# Patient Record
Sex: MALE | Race: WHITE | Employment: FULL TIME | URBAN - METROPOLITAN AREA
[De-identification: names, ages, dates, MRNs, and addresses within clinical notes are randomized per-mention and may not be internally consistent; named-entity substitution may affect disease eponyms.]

---

## 2020-06-17 PROBLEM — I10 ESSENTIAL HYPERTENSION: Status: ACTIVE | Noted: 2020-06-17

## 2020-06-17 PROBLEM — Z87.2 HISTORY OF ROSACEA: Status: ACTIVE | Noted: 2020-06-17

## 2020-06-17 PROBLEM — Z87.2 HISTORY OF ECZEMA: Status: ACTIVE | Noted: 2020-06-17

## 2020-08-04 PROBLEM — L85.3 DRY SKIN: Status: ACTIVE | Noted: 2020-08-04

## 2021-11-15 ENCOUNTER — APPOINTMENT (OUTPATIENT)
Dept: GENERAL RADIOLOGY | Age: 56
End: 2021-11-15
Attending: EMERGENCY MEDICINE
Payer: COMMERCIAL

## 2021-11-15 ENCOUNTER — HOSPITAL ENCOUNTER (EMERGENCY)
Age: 56
Discharge: HOME | End: 2021-11-15
Attending: EMERGENCY MEDICINE
Payer: COMMERCIAL

## 2021-11-15 VITALS
RESPIRATION RATE: 17 BRPM | OXYGEN SATURATION: 96 % | HEART RATE: 72 BPM | BODY MASS INDEX: 28.88 KG/M2 | DIASTOLIC BLOOD PRESSURE: 73 MMHG | WEIGHT: 195 LBS | HEIGHT: 69 IN | SYSTOLIC BLOOD PRESSURE: 113 MMHG | TEMPERATURE: 98 F

## 2021-11-15 DIAGNOSIS — M25.532 LEFT WRIST PAIN: ICD-10-CM

## 2021-11-15 DIAGNOSIS — M25.561 ACUTE PAIN OF RIGHT KNEE: ICD-10-CM
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
## 2021-11-15 DIAGNOSIS — M79.671 RIGHT FOOT PAIN: Primary | ICD-10-CM

## 2021-11-15 PROCEDURE — 73630 X-RAY EXAM OF FOOT: CPT

## 2021-11-15 PROCEDURE — 73562 X-RAY EXAM OF KNEE 3: CPT

## 2021-11-15 PROCEDURE — 73110 X-RAY EXAM OF WRIST: CPT

## 2021-11-15 PROCEDURE — 99283 EMERGENCY DEPT VISIT LOW MDM: CPT

## 2021-11-16 NOTE — ED NOTES
I have reviewed discharge instructions with the patient. The patient verbalized understanding. Patient left ED via Private vehicle. Discharge Method: ambulatory to Home with wife. .    Opportunity for questions and clarification provided. Patient given 0 scripts. To continue your aftercare when you leave the hospital, you may receive an automated call from our care team to check in on how you are doing. This is a free service and part of our promise to provide the best care and service to meet your aftercare needs.  If you have questions, or wish to unsubscribe from this service please call [phone]. Thank you for Choosing our [facility] Emergency Department.
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
## 2021-11-23 PROBLEM — R53.83 FATIGUE: Status: ACTIVE | Noted: 2021-11-23

## 2021-11-23 PROBLEM — M15.9 GENERALIZED OSTEOARTHRITIS: Status: ACTIVE | Noted: 2021-11-23

## 2021-11-23 PROBLEM — J34.89 MAXILLARY SINUS MASS: Status: ACTIVE | Noted: 2019-04-30

## 2021-11-23 PROBLEM — K14.8 TONGUE LESION: Status: ACTIVE | Noted: 2019-07-24

## 2021-11-23 PROBLEM — D80.3 IGG DEFICIENCY (HCC): Status: ACTIVE | Noted: 2019-01-01

## 2021-11-23 PROBLEM — J32.0 CHRONIC MAXILLARY SINUSITIS: Status: ACTIVE | Noted: 2019-07-24

## 2021-11-23 PROBLEM — R76.8 ELEVATED ANTINUCLEAR ANTIBODY (ANA) LEVEL: Status: ACTIVE | Noted: 2021-11-23

## 2021-11-23 PROBLEM — R04.0 EPISTAXIS: Status: ACTIVE | Noted: 2019-06-25

## 2021-11-23 PROBLEM — E66.3 OVERWEIGHT WITH BODY MASS INDEX (BMI) 25.0-29.9: Status: ACTIVE | Noted: 2021-11-23

## 2021-11-23 PROBLEM — H02.889 MEIBOMIAN GLAND DISEASE: Status: ACTIVE | Noted: 2021-11-23

## 2022-03-18 PROBLEM — D80.3 IGG DEFICIENCY (HCC): Status: ACTIVE | Noted: 2019-01-01

## 2022-03-18 PROBLEM — E66.3 OVERWEIGHT WITH BODY MASS INDEX (BMI) 25.0-29.9: Status: ACTIVE | Noted: 2021-11-23

## 2022-03-18 PROBLEM — Z87.2 HISTORY OF ECZEMA: Status: ACTIVE | Noted: 2020-06-17

## 2022-03-19 PROBLEM — I10 ESSENTIAL HYPERTENSION: Status: ACTIVE | Noted: 2020-06-17

## 2022-03-19 PROBLEM — R53.83 FATIGUE: Status: ACTIVE | Noted: 2021-11-23

## 2022-03-19 PROBLEM — Z87.2 HISTORY OF ROSACEA: Status: ACTIVE | Noted: 2020-06-17

## 2022-03-19 PROBLEM — R04.0 EPISTAXIS: Status: ACTIVE | Noted: 2019-06-25

## 2022-03-19 PROBLEM — H02.889 MEIBOMIAN GLAND DISEASE: Status: ACTIVE | Noted: 2021-11-23

## 2022-03-19 PROBLEM — J32.0 CHRONIC MAXILLARY SINUSITIS: Status: ACTIVE | Noted: 2019-07-24

## 2022-03-19 PROBLEM — M15.9 GENERALIZED OSTEOARTHRITIS: Status: ACTIVE | Noted: 2021-11-23

## 2022-03-19 PROBLEM — L85.3 DRY SKIN: Status: ACTIVE | Noted: 2020-08-04

## 2022-03-19 PROBLEM — J34.89 MAXILLARY SINUS MASS: Status: ACTIVE | Noted: 2019-04-30

## 2022-03-20 PROBLEM — K14.8 TONGUE LESION: Status: ACTIVE | Noted: 2019-07-24

## 2022-03-20 PROBLEM — R76.8 ELEVATED ANTINUCLEAR ANTIBODY (ANA) LEVEL: Status: ACTIVE | Noted: 2021-11-23

## 2023-01-05 NOTE — TELEPHONE ENCOUNTER
Patient needs a refill of lisinopril to be sent to [facility] on [address] [place] [address] in [facility].  Patient has a physical scheduled for March 6th

## 2023-01-06 RX ORDER — LISINOPRIL 10 MG/1
10 TABLET ORAL DAILY
Qty: 90 TABLET | Refills: 3 | OUTPATIENT
Start: 2023-01-06

## 2023-01-06 NOTE — TELEPHONE ENCOUNTER
Appt. Made for 2/20/23 DISCUSSED RISK OF RETINAL TEAR or detachment, myopic degeneration, and glaucoma. Instructed to return immediately if new floaters, flashing lights, decreased central or peripheral vision, or pain.

## 2023-02-20 ENCOUNTER — OFFICE VISIT (OUTPATIENT)
Dept: INTERNAL MEDICINE CLINIC | Facility: CLINIC | Age: 58
End: 2023-02-20
Payer: COMMERCIAL

## 2023-02-20 ENCOUNTER — TELEPHONE (OUTPATIENT)
Dept: INTERNAL MEDICINE CLINIC | Facility: CLINIC | Age: 58
End: 2023-02-20

## 2023-02-20 VITALS
HEIGHT: 69 IN | OXYGEN SATURATION: 97 % | HEART RATE: 77 BPM | BODY MASS INDEX: 30.36 KG/M2 | DIASTOLIC BLOOD PRESSURE: 86 MMHG | WEIGHT: 205 LBS | SYSTOLIC BLOOD PRESSURE: 131 MMHG | TEMPERATURE: 98.2 F | RESPIRATION RATE: 16 BRPM

## 2023-02-20 DIAGNOSIS — Z12.11 ENCOUNTER FOR SCREENING COLONOSCOPY: ICD-10-CM

## 2023-02-20 DIAGNOSIS — I10 ESSENTIAL HYPERTENSION: ICD-10-CM

## 2023-02-20 DIAGNOSIS — Z53.20 HIV SCREENING DECLINED: ICD-10-CM

## 2023-02-20 DIAGNOSIS — Z12.5 SCREENING PSA (PROSTATE SPECIFIC ANTIGEN): ICD-10-CM

## 2023-02-20 DIAGNOSIS — R29.818 SUSPECTED SLEEP APNEA: ICD-10-CM

## 2023-02-20 DIAGNOSIS — Z23 NEED FOR SHINGLES VACCINE: Primary | ICD-10-CM

## 2023-02-20 PROCEDURE — 3079F DIAST BP 80-89 MM HG: CPT | Performed by: INTERNAL MEDICINE

## 2023-02-20 PROCEDURE — 99215 OFFICE O/P EST HI 40 MIN: CPT | Performed by: INTERNAL MEDICINE

## 2023-02-20 PROCEDURE — 3075F SYST BP GE 130 - 139MM HG: CPT | Performed by: INTERNAL MEDICINE

## 2023-02-20 RX ORDER — AMOXICILLIN 500 MG
CAPSULE ORAL
COMMUNITY

## 2023-02-20 RX ORDER — CYCLOSPORINE 0 G/ML
SOLUTION/ DROPS OPHTHALMIC; TOPICAL
COMMUNITY
Start: 2022-12-12

## 2023-02-20 RX ORDER — LISINOPRIL 10 MG/1
10 TABLET ORAL DAILY
Qty: 90 TABLET | Refills: 0 | Status: SHIPPED | OUTPATIENT
Start: 2023-02-20

## 2023-02-20 RX ORDER — ZOSTER VACCINE RECOMBINANT, ADJUVANTED 50 MCG/0.5
0.5 KIT INTRAMUSCULAR SEE ADMIN INSTRUCTIONS
Qty: 0.5 ML | Refills: 0 | Status: SHIPPED | OUTPATIENT
Start: 2023-02-20 | End: 2023-08-19

## 2023-02-20 SDOH — ECONOMIC STABILITY: HOUSING INSECURITY
IN THE LAST 12 MONTHS, WAS THERE A TIME WHEN YOU DID NOT HAVE A STEADY PLACE TO SLEEP OR SLEPT IN A SHELTER (INCLUDING NOW)?: PATIENT REFUSED

## 2023-02-20 SDOH — ECONOMIC STABILITY: FOOD INSECURITY: WITHIN THE PAST 12 MONTHS, YOU WORRIED THAT YOUR FOOD WOULD RUN OUT BEFORE YOU GOT MONEY TO BUY MORE.: PATIENT DECLINED

## 2023-02-20 SDOH — ECONOMIC STABILITY: FOOD INSECURITY: WITHIN THE PAST 12 MONTHS, THE FOOD YOU BOUGHT JUST DIDN'T LAST AND YOU DIDN'T HAVE MONEY TO GET MORE.: PATIENT DECLINED

## 2023-02-20 SDOH — ECONOMIC STABILITY: INCOME INSECURITY: HOW HARD IS IT FOR YOU TO PAY FOR THE VERY BASICS LIKE FOOD, HOUSING, MEDICAL CARE, AND HEATING?: PATIENT DECLINED

## 2023-02-20 ASSESSMENT — PATIENT HEALTH QUESTIONNAIRE - PHQ9
SUM OF ALL RESPONSES TO PHQ QUESTIONS 1-9: 0
1. LITTLE INTEREST OR PLEASURE IN DOING THINGS: 0
SUM OF ALL RESPONSES TO PHQ9 QUESTIONS 1 & 2: 0
2. FEELING DOWN, DEPRESSED OR HOPELESS: 0
SUM OF ALL RESPONSES TO PHQ QUESTIONS 1-9: 0

## 2023-02-20 ASSESSMENT — ENCOUNTER SYMPTOMS
ABDOMINAL PAIN: 0
COUGH: 0
SHORTNESS OF BREATH: 0

## 2023-02-20 NOTE — TELEPHONE ENCOUNTER
Kelvinus Alba was seen in office today and saw the HIV screening on his lab orders and would like to decline this test. Pt is requesting lab orders to be reordered without HIV screening.  Pt is having labs done at [facility].

## 2023-02-20 NOTE — PROGRESS NOTES
SUBJECTIVE:   Alyssia Lowe is a 56 y.o. male seen for a visit regarding   Chief Complaint   Patient presents with    Medication Refill        HPI  Dry eyes/Xerostomia, h/o ALIDA positive - saw Rheumatology; seeing [facility] eye [place], getting Light treatment for pain in his eye at [place] - dry eye specialist  HTN - no headache  Suspect sleep apnea - having pauses at night in breathing, snoring, has daytime tiredness  Elevated PSA - saw Urology  Did not do colonoscopy yet  Hyperlipidemia - on life style modification     Past Medical History, Past Surgical History, Family history, Social History, and Medications were all reviewed with the patient today and updated as necessary. Current Outpatient Medications   Medication Sig Dispense Refill    CEQUA 0.09 % SOLN INSTILL 1 DROP INTO BOTH EYES TWICE A DAY      Omega-3 Fatty Acids (FISH OIL) 1200 MG CAPS Take by mouth      lisinopril (PRINIVIL;ZESTRIL) 10 MG tablet Take 1 tablet by mouth daily 90 tablet 0    zoster recombinant adjuvanted vaccine (SHINGRIX) 50 MCG/0.5ML SUSR injection Inject 0.5 mLs into the muscle See Admin Instructions 1 dose now and repeat in 2-6 months 0.5 mL 0    ascorbic acid (VITAMIN C) 500 MG tablet Take by mouth      doxycycline hyclate (PERIOSTAT) 20 MG tablet Take 20 mg by mouth daily       No current facility-administered medications for this visit.      No Known Allergies  Patient Active Problem List   Diagnosis    IgG deficiency ([facility] [address]    History of eczema    Overweight with body mass index (BMI) 25.0-29.9    History of rosacea    Maxillary sinus mass    Fatigue    Epistaxis    Generalized osteoarthritis    Meibomian gland disease    Essential hypertension    Chronic maxillary sinusitis    Dry skin    Tongue lesion    Elevated antinuclear antibody (ALIDA) level    Kidney stone on left side     Past Medical History:   Diagnosis Date    Contact dermatitis and eczema due to cause     Hypertension     Kidney stones      Past Surgical History:   Procedure Laterality Date    HEENT      chronis sinus problems / no surgery    HEENT      vocal cord mass    ORTHOPEDIC SURGERY      hip replacement      Family History   Problem Relation Age of Onset    Cancer Mother      Social History     Tobacco Use    Smoking status: Never    Smokeless tobacco: Current   Substance Use Topics    Alcohol use: Not Currently         Review of Systems   Constitutional:  Negative for fever. Respiratory:  Negative for cough and shortness of breath. Cardiovascular:  Negative for chest pain and leg swelling. Gastrointestinal:  Negative for abdominal pain. Psychiatric/Behavioral:  Negative for behavioral problems and confusion. OBJECTIVE:  /86   Pulse 77   Temp 98.2 °F (36.8 °C) (Temporal)   Resp 16   Ht 5' 9\" (1.753 m)   Wt 205 lb (93 kg)   SpO2 97%   BMI 30.27 kg/m²      Physical Exam  Vitals and nursing note reviewed. Constitutional:       General: He is not in acute distress. Cardiovascular:      Rate and Rhythm: Normal rate and regular rhythm. Pulmonary:      Effort: Pulmonary effort is normal.      Breath sounds: No wheezing. Neurological:      General: No focal deficit present. Mental Status: He is oriented to person, place, and time. Psychiatric:         Mood and Affect: Mood normal.         Behavior: Behavior normal.       Medical problems and test results were reviewed with the patient today. No results found for this or any previous visit (from the past 672 hour(s)). ASSESSMENT and PLAN    Feng Waldron was seen today for medication refill. Diagnoses and all orders for this visit:    Need for shingles vaccine  -     zoster recombinant adjuvanted vaccine [facility]) 50 MCG/0.5ML SUSR injection; Inject 0.5 mLs into the muscle See Admin Instructions 1 dose now and repeat in 2-6 months    Screening PSA (prostate specific antigen)  -     Cancel: PSA Screening; Future  -     PSA Screening;  Future    Essential hypertension  - lisinopril (PRINIVIL;ZESTRIL) 10 MG tablet; Take 1 tablet by mouth daily  -     Cancel: CBC with Auto Differential; Future  -     Cancel: Comprehensive Metabolic Panel; Future  -     Cancel: Lipid Panel; Future  -     Cancel: TSH; Future  -     CBC with Auto Differential; Future  -     Comprehensive Metabolic Panel; Future  -     Lipid Panel; Future  -     TSH; Future    Encounter for screening colonoscopy  -     [facility]    Suspected sleep apnea  -     Raheem Roy MD, Sleep Medicine, [place]    HIV screening declined    Other orders  -     Cancel: HIV 1/2 Ag/Ab, 4TH Generation,W Rflx Confirm; Future        Return in about 6 weeks (around 4/3/2023) for Annual Preventative Visit (labs ~ 3 days before). It took more than 40 min to care for this pt today  Over 50% of today's office visit was spent in face to face time in counseling   (may include anyor all of the following: reviewing test results, prognosis, importance of compliance, education about disease process, benefits of medications, instructions for management of acute flare-ups, and follow up plans).

## 2023-04-04 LAB
ALBUMIN SERPL-MCNC: 4.7 G/DL (ref 3.8–4.9)
ALBUMIN/GLOB SERPL: 2.8 {RATIO} (ref 1.2–2.2)
ALP SERPL-CCNC: 85 IU/L (ref 44–121)
ALT SERPL-CCNC: 37 IU/L (ref 0–44)
AST SERPL-CCNC: 29 IU/L (ref 0–40)
BASOPHILS # BLD AUTO: 0.1 X10E3/UL (ref 0–0.2)
BASOPHILS NFR BLD AUTO: 1 %
BILIRUB SERPL-MCNC: 0.9 MG/DL (ref 0–1.2)
BUN SERPL-MCNC: 15 MG/DL (ref 6–24)
BUN/CREAT SERPL: 14 (ref 9–20)
CALCIUM SERPL-MCNC: 9.8 MG/DL (ref 8.7–10.2)
CHLORIDE SERPL-SCNC: 104 MMOL/L (ref 96–106)
CHOLEST SERPL-MCNC: 174 MG/DL (ref 100–199)
CO2 SERPL-SCNC: 24 MMOL/L (ref 20–29)
CREAT SERPL-MCNC: 1.05 MG/DL (ref 0.76–1.27)
EGFRCR SERPLBLD CKD-EPI 2021: 84 ML/MIN/1.73
EOSINOPHIL # BLD AUTO: 0.2 X10E3/UL (ref 0–0.4)
EOSINOPHIL NFR BLD AUTO: 4 %
ERYTHROCYTE [DISTWIDTH] IN BLOOD BY AUTOMATED COUNT: 13.2 % (ref 11.6–15.4)
GLOBULIN SER CALC-MCNC: 1.7 G/DL (ref 1.5–4.5)
GLUCOSE SERPL-MCNC: 101 MG/DL (ref 70–99)
HCT VFR BLD AUTO: 49 % (ref 37.5–51)
HDLC SERPL-MCNC: 43 MG/DL
HGB BLD-MCNC: 16.5 G/DL (ref 13–17.7)
IMM GRANULOCYTES # BLD AUTO: 0 X10E3/UL (ref 0–0.1)
IMM GRANULOCYTES NFR BLD AUTO: 0 %
IMP & REVIEW OF LAB RESULTS: NORMAL
LDLC SERPL CALC-MCNC: 113 MG/DL (ref 0–99)
LYMPHOCYTES # BLD AUTO: 1.6 X10E3/UL (ref 0.7–3.1)
LYMPHOCYTES NFR BLD AUTO: 27 %
MCH RBC QN AUTO: 31.1 PG (ref 26.6–33)
MCHC RBC AUTO-ENTMCNC: 33.7 G/DL (ref 31.5–35.7)
MCV RBC AUTO: 93 FL (ref 79–97)
MONOCYTES # BLD AUTO: 0.6 X10E3/UL (ref 0.1–0.9)
MONOCYTES NFR BLD AUTO: 10 %
NEUTROPHILS # BLD AUTO: 3.5 X10E3/UL (ref 1.4–7)
NEUTROPHILS NFR BLD AUTO: 58 %
PLATELET # BLD AUTO: 193 X10E3/UL (ref 150–450)
POTASSIUM SERPL-SCNC: 4.6 MMOL/L (ref 3.5–5.2)
PROT SERPL-MCNC: 6.4 G/DL (ref 6–8.5)
PSA SERPL-MCNC: 2 NG/ML (ref 0–4)
RBC # BLD AUTO: 5.3 X10E6/UL (ref 4.14–5.8)
SODIUM SERPL-SCNC: 141 MMOL/L (ref 134–144)
TRIGL SERPL-MCNC: 97 MG/DL (ref 0–149)
TSH SERPL DL<=0.005 MIU/L-ACNC: 0.54 UIU/ML (ref 0.45–4.5)
VLDLC SERPL CALC-MCNC: 18 MG/DL (ref 5–40)
WBC # BLD AUTO: 6.1 X10E3/UL (ref 3.4–10.8)

## 2023-04-17 ENCOUNTER — OFFICE VISIT (OUTPATIENT)
Dept: INTERNAL MEDICINE CLINIC | Facility: CLINIC | Age: 58
End: 2023-04-17
Payer: COMMERCIAL

## 2023-04-17 VITALS
RESPIRATION RATE: 16 BRPM | WEIGHT: 208 LBS | DIASTOLIC BLOOD PRESSURE: 85 MMHG | SYSTOLIC BLOOD PRESSURE: 128 MMHG | HEART RATE: 74 BPM | HEIGHT: 69 IN | OXYGEN SATURATION: 98 % | BODY MASS INDEX: 30.81 KG/M2

## 2023-04-17 DIAGNOSIS — Z00.00 ROUTINE GENERAL MEDICAL EXAMINATION AT A HEALTH CARE FACILITY: ICD-10-CM

## 2023-04-17 DIAGNOSIS — I10 ESSENTIAL HYPERTENSION: Primary | ICD-10-CM

## 2023-04-17 PROCEDURE — 3074F SYST BP LT 130 MM HG: CPT | Performed by: INTERNAL MEDICINE

## 2023-04-17 PROCEDURE — 3079F DIAST BP 80-89 MM HG: CPT | Performed by: INTERNAL MEDICINE

## 2023-04-17 PROCEDURE — 99386 PREV VISIT NEW AGE 40-64: CPT | Performed by: INTERNAL MEDICINE

## 2023-04-17 ASSESSMENT — ENCOUNTER SYMPTOMS
COUGH: 0
ABDOMINAL PAIN: 0
SHORTNESS OF BREATH: 0

## 2023-04-17 ASSESSMENT — PATIENT HEALTH QUESTIONNAIRE - PHQ9
SUM OF ALL RESPONSES TO PHQ9 QUESTIONS 1 & 2: 0
SUM OF ALL RESPONSES TO PHQ QUESTIONS 1-9: 0
SUM OF ALL RESPONSES TO PHQ QUESTIONS 1-9: 0
2. FEELING DOWN, DEPRESSED OR HOPELESS: 0
1. LITTLE INTEREST OR PLEASURE IN DOING THINGS: 0
SUM OF ALL RESPONSES TO PHQ QUESTIONS 1-9: 0
SUM OF ALL RESPONSES TO PHQ QUESTIONS 1-9: 0

## 2023-04-17 NOTE — PROGRESS NOTES
Annual Prevention visit    I have reviewed the patient's medical history in detail and updated the computerized patient record. History   Works /technician    Dry eyes/Xerostomia, h/o ALIDA positive - saw Rheumatology; seeing [facility] eye [place], getting Light treatment for pain in his eye at [place] - dry eye specialist  HTN - no headache, not taking lisinopril yesterday, advised home BP log  Suspect sleep apnea - having pauses at night in breathing, snoring, has daytime tiredness, has appt. with sleep medicine in May  H/o Elevated PSA - saw Urology  Hyperglycemia on labs  Had tdap in 2019 per patient  Patient plans to get Shingles vaccine in the future  Did not do colonoscopy yet, reminded again, patient will call them  Hyperlipidemia - on life style modification, will review next visit with BP    Past Medical History:   Diagnosis Date    Contact dermatitis and eczema due to cause     Hypertension     Kidney stones       Past Surgical History:   Procedure Laterality Date    HEENT      chronis sinus problems / no surgery    HEENT      vocal cord mass    ORTHOPEDIC SURGERY      hip replacement      Current Outpatient Medications   Medication Sig Dispense Refill    CEQUA 0.09 % SOLN INSTILL 1 DROP INTO BOTH EYES TWICE A DAY      Omega-3 Fatty Acids (FISH OIL) 1200 MG CAPS Take by mouth      lisinopril (PRINIVIL;ZESTRIL) 10 MG tablet Take 1 tablet by mouth daily 90 tablet 0    ascorbic acid (VITAMIN C) 500 MG tablet Take by mouth      doxycycline hyclate (PERIOSTAT) 20 MG tablet Take 1 tablet by mouth daily      zoster recombinant adjuvanted vaccine [facility]) 50 MCG/0.5ML SUSR injection Inject 0.5 mLs into the muscle See Admin Instructions 1 dose now and repeat in 2-6 months (Patient not taking: Reported on 4/17/2023) 0.5 mL 0     No current facility-administered medications for this visit.      No Known Allergies  Family History   Problem Relation Age of Onset    Cancer Mother      Social History

## 2023-05-02 ENCOUNTER — OFFICE VISIT (OUTPATIENT)
Dept: SLEEP MEDICINE | Age: 58
End: 2023-05-02
Payer: COMMERCIAL

## 2023-05-02 VITALS
RESPIRATION RATE: 16 BRPM | SYSTOLIC BLOOD PRESSURE: 120 MMHG | DIASTOLIC BLOOD PRESSURE: 90 MMHG | HEIGHT: 69 IN | WEIGHT: 209.2 LBS | OXYGEN SATURATION: 99 % | BODY MASS INDEX: 30.98 KG/M2 | HEART RATE: 79 BPM | TEMPERATURE: 97.2 F

## 2023-05-02 DIAGNOSIS — R06.83 SNORING: Primary | ICD-10-CM

## 2023-05-02 DIAGNOSIS — I10 ESSENTIAL HYPERTENSION: ICD-10-CM

## 2023-05-02 DIAGNOSIS — G25.81 RLS (RESTLESS LEGS SYNDROME): ICD-10-CM

## 2023-05-02 PROCEDURE — 3080F DIAST BP >= 90 MM HG: CPT | Performed by: INTERNAL MEDICINE

## 2023-05-02 PROCEDURE — 99204 OFFICE O/P NEW MOD 45 MIN: CPT | Performed by: INTERNAL MEDICINE

## 2023-05-02 PROCEDURE — 3074F SYST BP LT 130 MM HG: CPT | Performed by: INTERNAL MEDICINE

## 2023-05-02 RX ORDER — CLOBETASOL PROPIONATE 0.5 MG/G
CREAM TOPICAL 2 TIMES DAILY
COMMUNITY

## 2023-05-02 ASSESSMENT — SLEEP AND FATIGUE QUESTIONNAIRES
HOW LIKELY ARE YOU TO NOD OFF OR FALL ASLEEP WHILE SITTING QUIETLY AFTER LUNCH WITHOUT ALCOHOL: 1
HOW LIKELY ARE YOU TO NOD OFF OR FALL ASLEEP WHILE SITTING AND TALKING TO SOMEONE: 0
NECK CIRCUMFERENCE (INCHES): 15.5
HOW LIKELY ARE YOU TO NOD OFF OR FALL ASLEEP WHEN YOU ARE A PASSENGER IN A CAR FOR AN HOUR WITHOUT A BREAK: 2
HOW LIKELY ARE YOU TO NOD OFF OR FALL ASLEEP WHILE LYING DOWN TO REST IN THE AFTERNOON WHEN CIRCUMSTANCES PERMIT: 1
ESS TOTAL SCORE: 10
HOW LIKELY ARE YOU TO NOD OFF OR FALL ASLEEP IN A CAR, WHILE STOPPED FOR A FEW MINUTES IN TRAFFIC: 0
HOW LIKELY ARE YOU TO NOD OFF OR FALL ASLEEP WHILE WATCHING TV: 2
HOW LIKELY ARE YOU TO NOD OFF OR FALL ASLEEP WHILE SITTING AND READING: 2
HOW LIKELY ARE YOU TO NOD OFF OR FALL ASLEEP WHILE SITTING INACTIVE IN A PUBLIC PLACE: 2

## 2023-05-02 NOTE — PROGRESS NOTES
[address] [place] [address] Court, [address] [facility]  [phone]    PATIENT ID:  Mr. Annabelle Fajardo   1965, 56 y.o. male  His primary provider is Melvin Mckeon MD    CC: Mr. Kat Peralta comes in for evaluation of his  No chief complaint on file. HISTORY OF PRESENT ILLNESS  Mr. Kat Peralta is a 56 y.o.  male referred for snoring by Dr. Minda Barry. The patient has a medical history significant for hypertension, obesity with a BMI of 30, deviated septum with history of tonsillectomy but otherwise healthy. He is accompanied by his wife who provides collateral history. Patient is noted to snore and stop breathing in his sleep, this is worse on his back and he has done this for years, he does wake up with morning headache bilateral mild about 1-2 times a week usually resolved by breakfast, no nocturia. He has never had formal sleep testing. He does note sleepiness in meetings and occasionally with driving in the afternoon against the sun, he also gets forgetful at times. He falls asleep usually within 3 minutes and denies knowing anything going on when he is asleep, hard to wake up in the morning and his sleep is not restorative. He denies any insomnia or abnormal movements in sleep. He does occasionally have restless leg symptomatology about once a month and is not overly bothered by this. He has no other sleep concerns today and he is in fact here only because his wife made him come. ROS notable for:  No history of cardiopulmonary disease shortness of breath dyspnea chest pain, no history of syncope or neuromuscular weakness. He did have vocal cord lesion that was removed and found to be benign in the past.  He has had excessive weight gain.     Sleep Medicine 2023   Sitting and reading 2   Watching TV 2   Sitting, inactive in a public place (e.g. a theatre or a meeting) 2   As a passenger in a car for an hour without a break 2   Lying down to rest in the afternoon

## 2023-05-02 NOTE — ASSESSMENT & PLAN NOTE
Patient has clinical symptoms of restless leg syndrome, and this is not affecting his  ability to sleep. In fact it only occurs sporadically. Hernando Roa Restless leg syndrome symptomatology explained as well as educational handout given. Should this become bothersome in the future we can evaluate further at that time with blood work and treatment options.

## 2023-05-02 NOTE — PATIENT INSTRUCTIONS
It was a pleasure meeting you today. Here are some items that we discussed:    1. We will order a home sleep test and call you to discuss the results about 2 weeks after      Last Arriola MD  [phone]     As we discussed, I am recommending a portable home sleep study for you to further evaluate you for possible sleep apnea. The sleep lab will call you to make an appointment for you to  the monitor from our sleep lab. When you  your device, it will be fit to you by our sleep lab staff and you will put it on that night before going to bed. Please call us when you have your appointment so that we can schedule followup two weeks afterwards. Generally, we have you return the monitor to the lab the following day. The data is downloaded from the device, afterwhich one of our sleep specialist physicians will interpret the study and make recommendations for treatment based on the outcome of the study. If you have any questions please feel free to call our office. Apnea is defined as a pause in your breathing greater than 10 seconds. I will review the results of your sleep study at your next appointment. AHI, (apnea-hypopnea index) refers to the number of times you stop breathing per hour:  0-4.9  Is normal.  5-14.9 is mild sleep apnea. 15-29.9 is moderate sleep apnea. 30- and greater is severe sleep apnea    Last Arriola -[phone]    Sleep Apnea: Care Instructions  Overview     Sleep apnea means that you frequently stop breathing for 10 seconds or longer during sleep. It can be mild to severe, based on the number of times an hour that you stop breathing. Blocked or narrowed airways in your nose, mouth, or throat can cause sleep apnea. Your airway can become blocked when your throat muscles and tongue relax during sleep. You can help treat sleep apnea at home by making lifestyle changes.  You also can use a CPAP breathing machine that keeps tissues in the throat from blocking

## 2023-05-02 NOTE — ASSESSMENT & PLAN NOTE
Risk factors include snoring, witnessed apneas, unrefreshing sleep. His BMI is 30, with comorbidities of hypertension. I counseled him about obstructive sleep apnea including potential risks and complications of untreated disease with attention to neurocognitive effects as well as cardiovascular effects. Relationship to his symptoms was discussed. I recommended evaluation with complete test.  I explained testing procedure. I then reviewed treatment options. He is agreeable to evaluation and treatment. We will follow up 2 weeks after the study by phone to review the results. Educational handout given on KYLIE.

## 2023-05-04 ENCOUNTER — PATIENT MESSAGE (OUTPATIENT)
Dept: INTERNAL MEDICINE CLINIC | Facility: CLINIC | Age: 58
End: 2023-05-04

## 2023-05-04 DIAGNOSIS — I10 ESSENTIAL HYPERTENSION: ICD-10-CM

## 2023-05-11 RX ORDER — LISINOPRIL 10 MG/1
10 TABLET ORAL DAILY
Qty: 90 TABLET | Refills: 1 | Status: SHIPPED | OUTPATIENT
Start: 2023-05-11

## 2023-05-11 NOTE — TELEPHONE ENCOUNTER
From: Jessica Arzola  To: Dr. Severa Barker: 5/4/2023 1:14 PM EDT  Subject: Blood pressure readings    Doctor here is my blood pressure readings. 4-19-23 130/85  4-20-23 125/68 and 112/70  4-21-23 126/80  4-23-23 136/82  4-25-23 126/87  4-26-23 131/82  4-28-23 125/81  5-1-23 131/87  5-2-23 131/77  5-3-23 124/83  5-4-23 132/85  Please let me know if my medicine is going to change or stay the same.   Thanks,  Nan Chua

## 2023-05-17 PROBLEM — Z00.00 ROUTINE GENERAL MEDICAL EXAMINATION AT A HEALTH CARE FACILITY: Status: RESOLVED | Noted: 2023-04-17 | Resolved: 2023-05-17

## 2023-09-25 ENCOUNTER — HOSPITAL ENCOUNTER (OUTPATIENT)
Dept: SLEEP CENTER | Age: 58
Discharge: HOME | End: 2023-09-28

## 2023-10-12 NOTE — PROGRESS NOTES
[address] [place] [address]  [phone]    Patient Name:  Luly Ivan  YOB: 1965      Office Visit 10/13/2023    CHIEF COMPLAINT:    Chief Complaint   Patient presents with    Sleep Apnea    Sleep Study    Results         HISTORY OF PRESENT ILLNESS:  Patient is an 56 y.o. male seen today for follow up of RLS/KYLIE. Pt had a PSG/HST on 9/25/23 with an AHI of 7.4/hr with desaturations to 88%. Pt is accompanied by his wife. Pt reports that he continues to feel tired all of the time. He has very little energy. He just is not sleeping well. We discussed sleep apnea, risks of untreated sleep apnea, and then reviewed his sleep study. We then discussed his treatment options. Pt agreeable to start PAP therapy. Orders sent to [facility].              10/13/2023    11:00 AM 5/2/2023     3:38 PM   Sleep Medicine   Sitting and reading 3 2   Watching TV 2 2   Sitting, inactive in a public place (e.g. 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

## 2023-10-13 ENCOUNTER — OFFICE VISIT (OUTPATIENT)
Dept: SLEEP MEDICINE | Age: 58
End: 2023-10-13
Payer: COMMERCIAL

## 2023-10-13 VITALS
SYSTOLIC BLOOD PRESSURE: 120 MMHG | OXYGEN SATURATION: 98 % | BODY MASS INDEX: 30.3 KG/M2 | RESPIRATION RATE: 16 BRPM | TEMPERATURE: 97.2 F | WEIGHT: 204.6 LBS | HEART RATE: 71 BPM | DIASTOLIC BLOOD PRESSURE: 80 MMHG | HEIGHT: 69 IN

## 2023-10-13 DIAGNOSIS — G47.33 OSA (OBSTRUCTIVE SLEEP APNEA): Primary | ICD-10-CM

## 2023-10-13 DIAGNOSIS — G47.34 NOCTURNAL HYPOXEMIA: ICD-10-CM

## 2023-10-13 PROCEDURE — 3074F SYST BP LT 130 MM HG: CPT | Performed by: PHYSICIAN ASSISTANT

## 2023-10-13 PROCEDURE — 99214 OFFICE O/P EST MOD 30 MIN: CPT | Performed by: PHYSICIAN ASSISTANT

## 2023-10-13 PROCEDURE — 3079F DIAST BP 80-89 MM HG: CPT | Performed by: PHYSICIAN ASSISTANT

## 2023-10-13 ASSESSMENT — SLEEP AND FATIGUE QUESTIONNAIRES
HOW LIKELY ARE YOU TO NOD OFF OR FALL ASLEEP WHILE SITTING INACTIVE IN A PUBLIC PLACE: 1
ESS TOTAL SCORE: 14
HOW LIKELY ARE YOU TO NOD OFF OR FALL ASLEEP IN A CAR, WHILE STOPPED FOR A FEW MINUTES IN TRAFFIC: 0
HOW LIKELY ARE YOU TO NOD OFF OR FALL ASLEEP WHEN YOU ARE A PASSENGER IN A CAR FOR AN HOUR WITHOUT A BREAK: 2
HOW LIKELY ARE YOU TO NOD OFF OR FALL ASLEEP WHILE SITTING QUIETLY AFTER LUNCH WITHOUT ALCOHOL: 2
HOW LIKELY ARE YOU TO NOD OFF OR FALL ASLEEP WHILE WATCHING TV: 2
HOW LIKELY ARE YOU TO NOD OFF OR FALL ASLEEP WHILE SITTING AND TALKING TO SOMEONE: 1
HOW LIKELY ARE YOU TO NOD OFF OR FALL ASLEEP WHILE LYING DOWN TO REST IN THE AFTERNOON WHEN CIRCUMSTANCES PERMIT: 3
HOW LIKELY ARE YOU TO NOD OFF OR FALL ASLEEP WHILE SITTING AND READING: 3

## 2023-10-13 NOTE — PATIENT INSTRUCTIONS
The company who will be taking care of your CPAP supplies is:     Address: [address] [place] [address] [address]  Phone: [phone] Option #1            Fax: [phone]

## 2023-12-04 DIAGNOSIS — I10 ESSENTIAL HYPERTENSION: ICD-10-CM

## 2023-12-04 RX ORDER — LISINOPRIL 10 MG/1
10 TABLET ORAL DAILY
Qty: 30 TABLET | Refills: 0 | Status: SHIPPED | OUTPATIENT
Start: 2023-12-04

## 2023-12-04 NOTE — TELEPHONE ENCOUNTER
NEEDING:  - Lisinopril--10 mg/still has pills for ten more days/90 days supply[facility] [address], [place]/[phone]    He stated that he called the pharmacy last week, but he hasn't received any call from us nor from his pharmacy.

## 2024-01-17 DIAGNOSIS — I10 ESSENTIAL HYPERTENSION: ICD-10-CM

## 2024-01-17 NOTE — TELEPHONE ENCOUNTER
----- Message from Nga Rueda sent at 1/17/2024  3:54 PM EST -----  Subject: Refill Request    QUESTIONS  Name of Medication? lisinopril (PRINIVIL;ZESTRIL) 10 MG tablet  Patient-reported dosage and instructions? 10 MG 1 tablet daily  How many days do you have left? 10  [id number]  Pharmacy phone number (if available)? [phone]  Additional Information for Provider? 90 day supply  ---------------------------------------------------------------------------  --------------  CALL BACK INFO  What is the best way for the office to contact you? OK to leave message on   voicemail  Preferred Call Back Phone Number? [phone]  ---------------------------------------------------------------------------  --------------  SCRIPT ANSWERS  Relationship to Patient? Self

## 2024-01-18 RX ORDER — LISINOPRIL 10 MG/1
10 TABLET ORAL DAILY
Qty: 90 TABLET | Refills: 0 | Status: SHIPPED | OUTPATIENT
Start: 2024-01-18

## 2024-02-06 NOTE — PROGRESS NOTES
I encouraged him to continue utilizing his CPAP machine.     Elliot Rose MD  Sleep Medicine/Internal Medicine/Pediatrics

## 2024-02-07 ENCOUNTER — OFFICE VISIT (OUTPATIENT)
Dept: SLEEP MEDICINE | Age: 59
End: 2024-02-07
Payer: COMMERCIAL

## 2024-02-07 VITALS
OXYGEN SATURATION: 98 % | RESPIRATION RATE: 16 BRPM | DIASTOLIC BLOOD PRESSURE: 80 MMHG | BODY MASS INDEX: 31.67 KG/M2 | WEIGHT: 213.8 LBS | TEMPERATURE: 98.2 F | HEIGHT: 69 IN | SYSTOLIC BLOOD PRESSURE: 142 MMHG | HEART RATE: 82 BPM

## 2024-02-07 DIAGNOSIS — G25.81 RLS (RESTLESS LEGS SYNDROME): ICD-10-CM

## 2024-02-07 DIAGNOSIS — G47.33 OSA (OBSTRUCTIVE SLEEP APNEA): Primary | ICD-10-CM

## 2024-02-07 PROCEDURE — 3077F SYST BP >= 140 MM HG: CPT | Performed by: INTERNAL MEDICINE

## 2024-02-07 PROCEDURE — 3079F DIAST BP 80-89 MM HG: CPT | Performed by: INTERNAL MEDICINE

## 2024-02-07 PROCEDURE — 99214 OFFICE O/P EST MOD 30 MIN: CPT | Performed by: INTERNAL MEDICINE

## 2024-02-07 ASSESSMENT — SLEEP AND FATIGUE QUESTIONNAIRES
ESS TOTAL SCORE: 10
HOW LIKELY ARE YOU TO NOD OFF OR FALL ASLEEP WHILE SITTING QUIETLY AFTER LUNCH WITHOUT ALCOHOL: 1
HOW LIKELY ARE YOU TO NOD OFF OR FALL ASLEEP WHILE SITTING INACTIVE IN A PUBLIC PLACE: 1
HOW LIKELY ARE YOU TO NOD OFF OR FALL ASLEEP WHILE SITTING AND READING: 2
HOW LIKELY ARE YOU TO NOD OFF OR FALL ASLEEP WHEN YOU ARE A PASSENGER IN A CAR FOR AN HOUR WITHOUT A BREAK: 1
HOW LIKELY ARE YOU TO NOD OFF OR FALL ASLEEP WHILE WATCHING TV: 2
HOW LIKELY ARE YOU TO NOD OFF OR FALL ASLEEP IN A CAR, WHILE STOPPED FOR A FEW MINUTES IN TRAFFIC: 1
HOW LIKELY ARE YOU TO NOD OFF OR FALL ASLEEP WHILE SITTING AND TALKING TO SOMEONE: 0
HOW LIKELY ARE YOU TO NOD OFF OR FALL ASLEEP WHILE LYING DOWN TO REST IN THE AFTERNOON WHEN CIRCUMSTANCES PERMIT: 2

## 2024-02-07 NOTE — ASSESSMENT & PLAN NOTE
Patient has mild obstructive sleep apnea, is actually doing fairly well clinically on APAP 6-16 cm H2O.  He notes improved sleep quality and less daytime sleepiness.  His download does show persistent respiratory events close to 5 an hour which is not much better than his baseline of AHI of 7.  He is having 2-3 central events on average an hour which likely are transient treatment emergent centrals.  These tend to get better over time and we will monitor for another 6 months and should these persist we may consider a formal in lab titration study.  He is also awaiting septoplasty with [facility], would like to see him back about 2 months after but he is definitely wanting to continue with CPAP at this time as he does find it helpful.  Care of his machine and mask selection discussed educational handout given and sleep study reviewed briefly again and copy given to patient for his records.

## 2024-02-07 NOTE — PATIENT INSTRUCTIONS
equipment company.   If you are still having issues, please  use [employer] or call to contact our sleep clinic so that we can assist you.     If you have issues that are not about your CPAP (example: medication refills or side effects), please call the  at [phone].  Please work on maintaining a healthy weight.  A BMI <30 is considered a healthy weight. Current Body mass index is 31.57 kg/m².. If you are overweight, a loss of 2 pounds per month still comes out to about 25 pounds per year and could allow a reduction in CPAP pressure or even discontinuation of CPAP.

## 2024-04-18 ENCOUNTER — OFFICE VISIT (OUTPATIENT)
Dept: INTERNAL MEDICINE CLINIC | Facility: CLINIC | Age: 59
End: 2024-04-18

## 2024-04-18 VITALS
BODY MASS INDEX: 30.96 KG/M2 | OXYGEN SATURATION: 97 % | HEART RATE: 68 BPM | SYSTOLIC BLOOD PRESSURE: 120 MMHG | DIASTOLIC BLOOD PRESSURE: 86 MMHG | HEIGHT: 69 IN | WEIGHT: 209 LBS | RESPIRATION RATE: 16 BRPM

## 2024-04-18 DIAGNOSIS — I10 ESSENTIAL HYPERTENSION: ICD-10-CM

## 2024-04-18 DIAGNOSIS — E78.2 MIXED HYPERLIPIDEMIA: ICD-10-CM

## 2024-04-18 DIAGNOSIS — R73.9 HYPERGLYCEMIA: ICD-10-CM

## 2024-04-18 DIAGNOSIS — Z12.5 SCREENING PSA (PROSTATE SPECIFIC ANTIGEN): ICD-10-CM

## 2024-04-18 DIAGNOSIS — R73.9 HYPERGLYCEMIA: Primary | ICD-10-CM

## 2024-04-18 LAB
BASOPHILS # BLD AUTO: 0.1 X10E3/UL (ref 0–0.2)
BASOPHILS NFR BLD AUTO: 1 %
EOSINOPHIL # BLD AUTO: 0.1 X10E3/UL (ref 0–0.4)
EOSINOPHIL NFR BLD AUTO: 2 %
ERYTHROCYTE [DISTWIDTH] IN BLOOD BY AUTOMATED COUNT: 12.4 % (ref 11.6–15.4)
HBA1C MFR BLD: 5.7 % (ref 4.8–5.6)
HCT VFR BLD AUTO: 49 % (ref 37.5–51)
HGB BLD-MCNC: 16.5 G/DL (ref 13–17.7)
IMM GRANULOCYTES # BLD AUTO: 0 X10E3/UL (ref 0–0.1)
IMM GRANULOCYTES NFR BLD AUTO: 0 %
LYMPHOCYTES # BLD AUTO: 1.8 X10E3/UL (ref 0.7–3.1)
LYMPHOCYTES NFR BLD AUTO: 29 %
MCH RBC QN AUTO: 31.3 PG (ref 26.6–33)
MCHC RBC AUTO-ENTMCNC: 33.7 G/DL (ref 31.5–35.7)
MCV RBC AUTO: 93 FL (ref 79–97)
MONOCYTES # BLD AUTO: 0.6 X10E3/UL (ref 0.1–0.9)
MONOCYTES NFR BLD AUTO: 9 %
NEUTROPHILS # BLD AUTO: 3.6 X10E3/UL (ref 1.4–7)
NEUTROPHILS NFR BLD AUTO: 59 %
PLATELET # BLD AUTO: 205 X10E3/UL (ref 150–450)
RBC # BLD AUTO: 5.28 X10E6/UL (ref 4.14–5.8)
WBC # BLD AUTO: 6.3 X10E3/UL (ref 3.4–10.8)

## 2024-04-18 RX ORDER — LISINOPRIL 10 MG/1
10 TABLET ORAL DAILY
Qty: 90 TABLET | Refills: 0 | Status: CANCELLED | OUTPATIENT
Start: 2024-04-18

## 2024-04-18 SDOH — ECONOMIC STABILITY: FOOD INSECURITY: WITHIN THE PAST 12 MONTHS, YOU WORRIED THAT YOUR FOOD WOULD RUN OUT BEFORE YOU GOT MONEY TO BUY MORE.: PATIENT DECLINED

## 2024-04-18 SDOH — ECONOMIC STABILITY: HOUSING INSECURITY
IN THE LAST 12 MONTHS, WAS THERE A TIME WHEN YOU DID NOT HAVE A STEADY PLACE TO SLEEP OR SLEPT IN A SHELTER (INCLUDING NOW)?: PATIENT DECLINED

## 2024-04-18 SDOH — ECONOMIC STABILITY: INCOME INSECURITY: HOW HARD IS IT FOR YOU TO PAY FOR THE VERY BASICS LIKE FOOD, HOUSING, MEDICAL CARE, AND HEATING?: PATIENT DECLINED

## 2024-04-18 SDOH — ECONOMIC STABILITY: FOOD INSECURITY: WITHIN THE PAST 12 MONTHS, THE FOOD YOU BOUGHT JUST DIDN'T LAST AND YOU DIDN'T HAVE MONEY TO GET MORE.: PATIENT DECLINED

## 2024-04-18 ASSESSMENT — ENCOUNTER SYMPTOMS
ABDOMINAL PAIN: 0
SHORTNESS OF BREATH: 0
COUGH: 0

## 2024-04-18 ASSESSMENT — PATIENT HEALTH QUESTIONNAIRE - PHQ9
SUM OF ALL RESPONSES TO PHQ QUESTIONS 1-9: 0
2. FEELING DOWN, DEPRESSED OR HOPELESS: NOT AT ALL
SUM OF ALL RESPONSES TO PHQ9 QUESTIONS 1 & 2: 0
SUM OF ALL RESPONSES TO PHQ QUESTIONS 1-9: 0
SUM OF ALL RESPONSES TO PHQ QUESTIONS 1-9: 0
1. LITTLE INTEREST OR PLEASURE IN DOING THINGS: NOT AT ALL
SUM OF ALL RESPONSES TO PHQ QUESTIONS 1-9: 0

## 2024-04-18 NOTE — PROGRESS NOTES
Annual Prevention visit    I have reviewed the patient's medical history in detail and updated the computerized patient record.     History   Works /technician,      Dry eyes/Xerostomia, h/o ALIDA positive - saw Rheumatology; seeing [facility] locally and once or twice a year may get Light treatment for pain in his eye at [place] - dry eye specialist  HTN - no headache  KYLIE, RLS - seeing Sleep medicine - having pauses at night in breathing, snoring, has daytime tiredness, has appt. with sleep medicine in May  H/o Elevated PSA - saw Urology  Hyperglycemia on labs  Patient refused HIV test  Had tdap in 2019 per patient  Had Shingles vaccine  Colon polyps - next colonoscopy in 2026  Hyperlipidemia - on life style modification          Past Medical History:   Diagnosis Date    Contact dermatitis and eczema due to cause     Hypertension     Kidney stones       Past Surgical History:   Procedure Laterality Date    HEENT      chronis sinus problems / no surgery    HEENT      vocal cord mass    ORTHOPEDIC SURGERY      hip replacement      Current Outpatient Medications   Medication Sig Dispense Refill    lisinopril (PRINIVIL;ZESTRIL) 10 MG tablet Take 1 tablet by mouth daily 90 tablet 0    clobetasol (TEMOVATE) 0.05 % cream Apply topically 2 times daily Apply topically 2 times daily.      CEQUA 0.09 % SOLN INSTILL 1 DROP INTO BOTH EYES TWICE A DAY      Omega-3 Fatty Acids (FISH OIL) 1200 MG CAPS Take by mouth      doxycycline hyclate (PERIOSTAT) 20 MG tablet Take 1 tablet by mouth daily       No current facility-administered medications for this visit.     No Known Allergies  Family History   Problem Relation Age of Onset    Cancer Mother      Social History     Tobacco Use    Smoking status: Never    Smokeless tobacco: Never   Substance Use Topics    Alcohol use: Not Currently     Patient Active Problem List   Diagnosis    IgG deficiency (HCC)    History of eczema    Overweight with body mass index

## 2024-04-19 DIAGNOSIS — I10 ESSENTIAL HYPERTENSION: ICD-10-CM

## 2024-04-19 LAB
ALBUMIN SERPL-MCNC: 4.7 G/DL (ref 3.8–4.9)
ALBUMIN/GLOB SERPL: 2.2 {RATIO} (ref 1.2–2.2)
ALP SERPL-CCNC: 82 IU/L (ref 44–121)
ALT SERPL-CCNC: 43 IU/L (ref 0–44)
AST SERPL-CCNC: 28 IU/L (ref 0–40)
BILIRUB SERPL-MCNC: 0.8 MG/DL (ref 0–1.2)
BUN SERPL-MCNC: 13 MG/DL (ref 6–24)
BUN/CREAT SERPL: 12 (ref 9–20)
CALCIUM SERPL-MCNC: 10 MG/DL (ref 8.7–10.2)
CHLORIDE SERPL-SCNC: 101 MMOL/L (ref 96–106)
CHOLEST SERPL-MCNC: 206 MG/DL (ref 100–199)
CO2 SERPL-SCNC: 23 MMOL/L (ref 20–29)
CREAT SERPL-MCNC: 1.09 MG/DL (ref 0.76–1.27)
EGFRCR SERPLBLD CKD-EPI 2021: 80 ML/MIN/1.73
GLOBULIN SER CALC-MCNC: 2.1 G/DL (ref 1.5–4.5)
GLUCOSE SERPL-MCNC: 95 MG/DL (ref 70–99)
HDLC SERPL-MCNC: 46 MG/DL
IMP & REVIEW OF LAB RESULTS: NORMAL
LDLC SERPL CALC-MCNC: 142 MG/DL (ref 0–99)
POTASSIUM SERPL-SCNC: 4.8 MMOL/L (ref 3.5–5.2)
PROT SERPL-MCNC: 6.8 G/DL (ref 6–8.5)
PSA SERPL-MCNC: 1.8 NG/ML (ref 0–4)
SODIUM SERPL-SCNC: 140 MMOL/L (ref 134–144)
TRIGL SERPL-MCNC: 99 MG/DL (ref 0–149)
TSH SERPL DL<=0.005 MIU/L-ACNC: 0.54 UIU/ML (ref 0.45–4.5)
VLDLC SERPL CALC-MCNC: 18 MG/DL (ref 5–40)

## 2024-04-19 RX ORDER — LISINOPRIL 10 MG/1
10 TABLET ORAL DAILY
Qty: 90 TABLET | Refills: 1 | Status: SHIPPED | OUTPATIENT
Start: 2024-04-19

## 2024-08-19 NOTE — PROGRESS NOTES
notes he is not snoring when wearing his mask.  He has seen Dr. Wesly Patel at [facility] and they have agreed to undergo a septoplasty he is awaiting the date on this but hopes after this he may be able to tolerate just a nasal mask.  He has questions about his history of nasal fractures and could this have led to his sleep disordered breathing.  He has not had any other change in health or medications since last seen.  He did note he had upper respiratory tract infection for about 3 weeks and during that time he had difficulty tolerating CPAP but this has now resolved.    From 2023  The patient has a medical history significant for hypertension, obesity with a BMI of 30, deviated septum with history of tonsillectomy but otherwise healthy.  He is accompanied by his wife who provides collateral history.  Patient is noted to snore and stop breathing in his sleep, this is worse on his back and he has done this for years, he does wake up with morning headache bilateral mild about 1-2 times a week usually resolved by breakfast, no nocturia.  He has never had formal sleep testing.  He does note sleepiness in meetings and occasionally with driving in the afternoon against the sun, he also gets forgetful at times.  He falls asleep usually within 3 minutes and denies knowing anything going on when he is asleep, hard to wake up in the morning and his sleep is not restorative.  He denies any insomnia or abnormal movements in sleep.  He does occasionally have restless leg symptomatology about once a month and is not overly bothered by this.  He has no other sleep concerns today and he is in fact here only because his wife made him come.     ROS notable for:  No history of cardiopulmonary disease shortness of breath dyspnea chest pain, no history of syncope or neuromuscular weakness.  He did have vocal cord lesion that was removed and found to be benign in the past.  He has had excessive weight gain.      8/21/2024     4:27

## 2024-08-21 ENCOUNTER — OFFICE VISIT (OUTPATIENT)
Dept: SLEEP MEDICINE | Age: 59
End: 2024-08-21
Payer: COMMERCIAL

## 2024-08-21 VITALS
RESPIRATION RATE: 16 BRPM | TEMPERATURE: 97.2 F | SYSTOLIC BLOOD PRESSURE: 120 MMHG | HEIGHT: 69 IN | WEIGHT: 212.2 LBS | HEART RATE: 66 BPM | OXYGEN SATURATION: 97 % | BODY MASS INDEX: 31.43 KG/M2 | DIASTOLIC BLOOD PRESSURE: 79 MMHG

## 2024-08-21 DIAGNOSIS — G47.33 OSA (OBSTRUCTIVE SLEEP APNEA): Primary | ICD-10-CM

## 2024-08-21 DIAGNOSIS — G25.81 RLS (RESTLESS LEGS SYNDROME): ICD-10-CM

## 2024-08-21 DIAGNOSIS — G47.31 CSA (CENTRAL SLEEP APNEA): ICD-10-CM

## 2024-08-21 PROCEDURE — 3074F SYST BP LT 130 MM HG: CPT | Performed by: INTERNAL MEDICINE

## 2024-08-21 PROCEDURE — 99213 OFFICE O/P EST LOW 20 MIN: CPT | Performed by: INTERNAL MEDICINE

## 2024-08-21 PROCEDURE — 3078F DIAST BP <80 MM HG: CPT | Performed by: INTERNAL MEDICINE

## 2024-08-21 PROCEDURE — G2211 COMPLEX E/M VISIT ADD ON: HCPCS | Performed by: INTERNAL MEDICINE

## 2024-08-21 ASSESSMENT — SLEEP AND FATIGUE QUESTIONNAIRES
HOW LIKELY ARE YOU TO NOD OFF OR FALL ASLEEP WHILE SITTING INACTIVE IN A PUBLIC PLACE: WOULD NEVER DOZE
HOW LIKELY ARE YOU TO NOD OFF OR FALL ASLEEP IN A CAR, WHILE STOPPED FOR A FEW MINUTES IN TRAFFIC: WOULD NEVER DOZE
HOW LIKELY ARE YOU TO NOD OFF OR FALL ASLEEP WHILE SITTING AND READING: SLIGHT CHANCE OF DOZING
HOW LIKELY ARE YOU TO NOD OFF OR FALL ASLEEP WHILE SITTING AND TALKING TO SOMEONE: WOULD NEVER DOZE
ESS TOTAL SCORE: 7
HOW LIKELY ARE YOU TO NOD OFF OR FALL ASLEEP WHILE LYING DOWN TO REST IN THE AFTERNOON WHEN CIRCUMSTANCES PERMIT: MODERATE CHANCE OF DOZING
HOW LIKELY ARE YOU TO NOD OFF OR FALL ASLEEP WHEN YOU ARE A PASSENGER IN A CAR FOR AN HOUR WITHOUT A BREAK: HIGH CHANCE OF DOZING
HOW LIKELY ARE YOU TO NOD OFF OR FALL ASLEEP WHILE SITTING QUIETLY AFTER LUNCH WITHOUT ALCOHOL: SLIGHT CHANCE OF DOZING
HOW LIKELY ARE YOU TO NOD OFF OR FALL ASLEEP WHILE WATCHING TV: WOULD NEVER DOZE

## 2024-08-21 NOTE — ASSESSMENT & PLAN NOTE
Patient is having a few centrals on download that could be contributing, suspect these may be treatment emergent centrals due to PAP therapy.  If on titration study centrals are present I have asked them to convert to BiPAP ST.

## 2024-08-21 NOTE — ASSESSMENT & PLAN NOTE
Patient has mild obstructive sleep apnea, is actually doing fairly well clinically on APAP 6-16 cm H2O.  He does admit today that his sleep is not as restorative as he was hoping when he started feeling very groggy in the morning..  His download once again shows persistent respiratory events close to 5 an hour which is not much better than his baseline of AHI of 7.  He is having 2-3 central events on average an hour which likely are transient treatment emergent centrals.  As he is tracking his data avidly and also would like to get his sleep apnea under better control we agreed to do an in lab titration study.  Now that he has had his nasal surgery he is free to try a nasal mask as well if he would like an supply order sent to [facility].  We will call him after the study and adjust settings as indicated following up in roughly 6 months.

## 2024-08-21 NOTE — PATIENT INSTRUCTIONS
It was a pleasure seeing you today.  Here are some items that we discussed:    1.   WE will order an in lab sleep study to try to find better settings.  Bring your mask that you like.    2.   We will call you to adjust settings about 2-3 weeks after study.      Elliot Rose MD  [phone]       A SLEEP STUDY HAS BEEN ORDERED FOR YOU.     It can take up to 2 weeks for this to be scheduled. If after 2 weeks you have not heard from the Sleep Lab, please call [phone] or [phone] to schedule your test.     Thank you,     [facility]

## 2024-09-29 ENCOUNTER — HOSPITAL ENCOUNTER (OUTPATIENT)
Dept: SLEEP CENTER | Age: 59
Discharge: HOME | End: 2024-10-02

## 2024-10-18 DIAGNOSIS — I10 ESSENTIAL HYPERTENSION: ICD-10-CM

## 2024-10-18 RX ORDER — LISINOPRIL 10 MG/1
10 TABLET ORAL DAILY
Qty: 90 TABLET | Refills: 0 | Status: SHIPPED | OUTPATIENT
Start: 2024-10-18

## 2024-10-18 NOTE — TELEPHONE ENCOUNTER
Lisinopril 10 MG   [facility]/pharmacy #[id number] - [place], [place] - [address]    NTP scheduled on 12/26/24 with [facility].     Please advise.

## 2024-10-20 ENCOUNTER — HOSPITAL ENCOUNTER (OUTPATIENT)
Dept: SLEEP CENTER | Age: 59
Discharge: HOME | End: 2024-10-23
Payer: COMMERCIAL

## 2024-10-20 PROCEDURE — 95811 POLYSOM 6/>YRS CPAP 4/> PARM: CPT

## 2024-11-13 ENCOUNTER — TELEPHONE (OUTPATIENT)
Dept: SLEEP MEDICINE | Age: 59
End: 2024-11-13

## 2024-11-13 DIAGNOSIS — G47.33 OSA (OBSTRUCTIVE SLEEP APNEA): Primary | ICD-10-CM

## 2024-11-13 NOTE — TELEPHONE ENCOUNTER
Patient had recent sleep study showing mild sleep apnea. Cpap therapy is recommended per sleep interp.  Patient currently on cpap. Pressure will be changed to Cpap 10cm. Patient has agreed to pressure change.    Stacy Patel MA

## 2024-12-31 ENCOUNTER — OFFICE VISIT (OUTPATIENT)
Dept: INTERNAL MEDICINE CLINIC | Facility: CLINIC | Age: 59
End: 2024-12-31
Payer: COMMERCIAL

## 2024-12-31 VITALS
BODY MASS INDEX: 31.84 KG/M2 | TEMPERATURE: 98 F | HEIGHT: 69 IN | HEART RATE: 78 BPM | SYSTOLIC BLOOD PRESSURE: 120 MMHG | DIASTOLIC BLOOD PRESSURE: 86 MMHG | OXYGEN SATURATION: 98 % | WEIGHT: 215 LBS

## 2024-12-31 DIAGNOSIS — E78.2 MIXED HYPERLIPIDEMIA: Primary | ICD-10-CM

## 2024-12-31 DIAGNOSIS — G47.33 OSA (OBSTRUCTIVE SLEEP APNEA): ICD-10-CM

## 2024-12-31 DIAGNOSIS — I10 ESSENTIAL HYPERTENSION: ICD-10-CM

## 2024-12-31 PROCEDURE — 99214 OFFICE O/P EST MOD 30 MIN: CPT | Performed by: INTERNAL MEDICINE

## 2024-12-31 PROCEDURE — 3074F SYST BP LT 130 MM HG: CPT | Performed by: INTERNAL MEDICINE

## 2024-12-31 PROCEDURE — 3079F DIAST BP 80-89 MM HG: CPT | Performed by: INTERNAL MEDICINE

## 2024-12-31 RX ORDER — DOXYCYCLINE HYCLATE 100 MG
100 TABLET ORAL DAILY
COMMUNITY
Start: 2024-12-13

## 2024-12-31 RX ORDER — LISINOPRIL 10 MG/1
10 TABLET ORAL DAILY
Qty: 90 TABLET | Refills: 3 | Status: SHIPPED | OUTPATIENT
Start: 2024-12-31

## 2024-12-31 NOTE — PROGRESS NOTES
has a history of eczema, which has recently flared up. He applies some cream for his skin rashes. He has a history of osteoarthritis, which necessitated a hip replacement. He has a history of multiple polyps, with the most recent colonoscopy performed on 08/28/2024. He is due for another colonoscopy in 2026. He has a history of tonsillectomy. He has a history of skin tag removal and sees a dermatologist every 6 months. He has no history of cancer. His last PSA test was conducted on 04/24/2024, with a result of 3.3. He is requesting a refill of his lisinopril prescription and wishes to schedule blood work and a wellness check in 04/2025.    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    MEDICATIONS  Lisinopril    Social History     Tobacco Use    Smoking status: Never    Smokeless tobacco: Never   Vaping Use    Vaping status: Never Used   Substance Use Topics    Alcohol use: Not Currently    Drug use: Never     Vitals:    12/31/24 0916   BP: 120/86   Site: Left Upper Arm   Position: Sitting   Cuff Size: Large Adult   Pulse: 78   Temp: 98 °F (36.7 °C)   TempSrc: Temporal   SpO2: 98%   Weight: 97.5 kg (215 lb)   Height: 1.753 m (5' 9\")     Body mass index is 31.75 kg/m².  Physical Exam  Vitals and nursing note reviewed.   Constitutional:       General: He is not in acute distress.     Appearance: Normal appearance. He is well-groomed. He is not ill-appearing or toxic-appearing.   HENT:      Head: Normocephalic and atraumatic.      Nose: Nose normal. No congestion.   Eyes:      Extraocular Movements: Extraocular movements intact.      Conjunctiva/sclera: Conjunctivae normal.   Cardiovascular:      Rate and Rhythm: Normal rate and regular rhythm.      Pulses: Normal pulses.      Heart sounds: Normal heart sounds. No murmur heard.  Pulmonary:      Effort: Pulmonary effort is normal. No respiratory distress.      Breath sounds: Normal breath sounds. No wheezing.   Abdominal:      General: Abdomen is flat. Bowel sounds

## 2025-02-27 ENCOUNTER — OFFICE VISIT (OUTPATIENT)
Dept: INTERNAL MEDICINE CLINIC | Facility: CLINIC | Age: 60
End: 2025-02-27
Payer: COMMERCIAL

## 2025-02-27 VITALS
HEART RATE: 82 BPM | DIASTOLIC BLOOD PRESSURE: 60 MMHG | HEIGHT: 69 IN | TEMPERATURE: 98.1 F | OXYGEN SATURATION: 99 % | BODY MASS INDEX: 30.51 KG/M2 | SYSTOLIC BLOOD PRESSURE: 100 MMHG | WEIGHT: 206 LBS

## 2025-02-27 DIAGNOSIS — R52 GENERALIZED BODY ACHES: ICD-10-CM

## 2025-02-27 DIAGNOSIS — R50.9 FEVER, UNSPECIFIED FEVER CAUSE: Primary | ICD-10-CM

## 2025-02-27 DIAGNOSIS — J10.1 INFLUENZA A: ICD-10-CM

## 2025-02-27 LAB
EXP DATE SOLUTION: NORMAL
EXP DATE SWAB: NORMAL
EXPIRATION DATE: NORMAL
INFLUENZA A ANTIGEN, POC: POSITIVE
INFLUENZA B ANTIGEN, POC: NEGATIVE
LOT NUMBER POC: NORMAL
LOT NUMBER SOLUTION: NORMAL
LOT NUMBER SWAB: NORMAL
SARS-COV-2 RNA, POC: NEGATIVE
VALID INTERNAL CONTROL, POC: YES

## 2025-02-27 PROCEDURE — 3078F DIAST BP <80 MM HG: CPT | Performed by: INTERNAL MEDICINE

## 2025-02-27 PROCEDURE — 3074F SYST BP LT 130 MM HG: CPT | Performed by: INTERNAL MEDICINE

## 2025-02-27 PROCEDURE — 99213 OFFICE O/P EST LOW 20 MIN: CPT | Performed by: INTERNAL MEDICINE

## 2025-02-27 PROCEDURE — 87502 INFLUENZA DNA AMP PROBE: CPT | Performed by: INTERNAL MEDICINE

## 2025-02-27 PROCEDURE — 87635 SARS-COV-2 COVID-19 AMP PRB: CPT | Performed by: INTERNAL MEDICINE

## 2025-02-27 RX ORDER — OSELTAMIVIR PHOSPHATE 75 MG/1
75 CAPSULE ORAL 2 TIMES DAILY
Qty: 10 CAPSULE | Refills: 0 | Status: SHIPPED | OUTPATIENT
Start: 2025-02-27 | End: 2025-03-04

## 2025-02-27 SDOH — ECONOMIC STABILITY: FOOD INSECURITY: WITHIN THE PAST 12 MONTHS, YOU WORRIED THAT YOUR FOOD WOULD RUN OUT BEFORE YOU GOT MONEY TO BUY MORE.: NEVER TRUE

## 2025-02-27 SDOH — ECONOMIC STABILITY: FOOD INSECURITY: WITHIN THE PAST 12 MONTHS, THE FOOD YOU BOUGHT JUST DIDN'T LAST AND YOU DIDN'T HAVE MONEY TO GET MORE.: NEVER TRUE

## 2025-02-27 ASSESSMENT — PATIENT HEALTH QUESTIONNAIRE - PHQ9
SUM OF ALL RESPONSES TO PHQ QUESTIONS 1-9: 0
1. LITTLE INTEREST OR PLEASURE IN DOING THINGS: NOT AT ALL
SUM OF ALL RESPONSES TO PHQ QUESTIONS 1-9: 0
2. FEELING DOWN, DEPRESSED OR HOPELESS: NOT AT ALL

## 2025-02-27 NOTE — PROGRESS NOTES
patient (or guardian, if applicable) and other individuals in attendance with the patient were advised that Artificial Intelligence will be utilized during this visit to record, process the conversation to generate a clinical note, and support improvement of the AI technology. The patient (or guardian, if applicable) and other individuals in attendance at the appointment consented to the use of AI, including the recording.        An electronic signature was used to authenticate this note.  Haresh Joshi DO

## 2025-02-28 ENCOUNTER — TELEPHONE (OUTPATIENT)
Dept: INTERNAL MEDICINE CLINIC | Facility: CLINIC | Age: 60
End: 2025-02-28

## 2025-02-28 NOTE — TELEPHONE ENCOUNTER
Had appointment yesterday for FLU  States he has some other symptoms  Coughing up yellow stuff today can something be called in for that    Today and would like to talk with the nurse     Please call patient back and advise what he needs to do

## 2025-04-29 ENCOUNTER — LAB (OUTPATIENT)
Dept: INTERNAL MEDICINE CLINIC | Facility: CLINIC | Age: 60
End: 2025-04-29

## 2025-04-29 DIAGNOSIS — I10 ESSENTIAL HYPERTENSION: ICD-10-CM

## 2025-04-29 DIAGNOSIS — E78.2 MIXED HYPERLIPIDEMIA: ICD-10-CM

## 2025-04-29 DIAGNOSIS — G47.33 OSA (OBSTRUCTIVE SLEEP APNEA): ICD-10-CM

## 2025-04-29 LAB
ALBUMIN SERPL-MCNC: 4 G/DL (ref 3.5–5)
ALBUMIN/GLOB SERPL: 1.5 (ref 1–1.9)
ALP SERPL-CCNC: 75 U/L (ref 40–129)
ALT SERPL-CCNC: 42 U/L (ref 8–55)
ANION GAP SERPL CALC-SCNC: 11 MMOL/L (ref 7–16)
AST SERPL-CCNC: 34 U/L (ref 15–37)
BILIRUB SERPL-MCNC: 0.7 MG/DL (ref 0–1.2)
BUN SERPL-MCNC: 15 MG/DL (ref 6–23)
CALCIUM SERPL-MCNC: 9.5 MG/DL (ref 8.8–10.2)
CHLORIDE SERPL-SCNC: 105 MMOL/L (ref 98–107)
CHOLEST SERPL-MCNC: 183 MG/DL (ref 0–200)
CO2 SERPL-SCNC: 25 MMOL/L (ref 20–29)
CREAT SERPL-MCNC: 1.07 MG/DL (ref 0.8–1.3)
ERYTHROCYTE [DISTWIDTH] IN BLOOD BY AUTOMATED COUNT: 13.1 % (ref 11.9–14.6)
EST. AVERAGE GLUCOSE BLD GHB EST-MCNC: 118 MG/DL
GLOBULIN SER CALC-MCNC: 2.6 G/DL (ref 2.3–3.5)
GLUCOSE SERPL-MCNC: 106 MG/DL (ref 70–99)
HBA1C MFR BLD: 5.8 % (ref 0–5.6)
HCT VFR BLD AUTO: 48.1 % (ref 41.1–50.3)
HDLC SERPL-MCNC: 35 MG/DL (ref 40–60)
HDLC SERPL: 5.2 (ref 0–5)
HGB BLD-MCNC: 15.9 G/DL (ref 13.6–17.2)
LDLC SERPL CALC-MCNC: 120 MG/DL (ref 0–100)
MCH RBC QN AUTO: 31 PG (ref 26.1–32.9)
MCHC RBC AUTO-ENTMCNC: 33.1 G/DL (ref 31.4–35)
MCV RBC AUTO: 93.8 FL (ref 82–102)
NRBC # BLD: 0 K/UL (ref 0–0.2)
PLATELET # BLD AUTO: 197 K/UL (ref 150–450)
PMV BLD AUTO: 11 FL (ref 9.4–12.3)
POTASSIUM SERPL-SCNC: 4.1 MMOL/L (ref 3.5–5.1)
PROT SERPL-MCNC: 6.7 G/DL (ref 6.3–8.2)
PSA SERPL-MCNC: 1.8 NG/ML (ref 0–4)
RBC # BLD AUTO: 5.13 M/UL (ref 4.23–5.6)
SODIUM SERPL-SCNC: 141 MMOL/L (ref 136–145)
TRIGL SERPL-MCNC: 140 MG/DL (ref 0–150)
TSH W FREE THYROID IF ABNORMAL: 0.75 UIU/ML (ref 0.27–4.2)
VLDLC SERPL CALC-MCNC: 28 MG/DL (ref 6–23)
WBC # BLD AUTO: 6.1 K/UL (ref 4.3–11.1)

## 2025-05-08 ENCOUNTER — OFFICE VISIT (OUTPATIENT)
Dept: INTERNAL MEDICINE CLINIC | Facility: CLINIC | Age: 60
End: 2025-05-08
Payer: COMMERCIAL

## 2025-05-08 VITALS
HEIGHT: 69 IN | BODY MASS INDEX: 30.66 KG/M2 | TEMPERATURE: 97.7 F | OXYGEN SATURATION: 97 % | SYSTOLIC BLOOD PRESSURE: 110 MMHG | DIASTOLIC BLOOD PRESSURE: 78 MMHG | WEIGHT: 207 LBS | HEART RATE: 87 BPM

## 2025-05-08 DIAGNOSIS — D80.3 IGG DEFICIENCY (HCC): ICD-10-CM

## 2025-05-08 DIAGNOSIS — I10 ESSENTIAL HYPERTENSION: ICD-10-CM

## 2025-05-08 PROCEDURE — 3074F SYST BP LT 130 MM HG: CPT | Performed by: INTERNAL MEDICINE

## 2025-05-08 PROCEDURE — 3078F DIAST BP <80 MM HG: CPT | Performed by: INTERNAL MEDICINE

## 2025-05-08 PROCEDURE — 99396 PREV VISIT EST AGE 40-64: CPT | Performed by: INTERNAL MEDICINE

## 2025-05-08 RX ORDER — AZITHROMYCIN 500 MG/1
500 TABLET, FILM COATED ORAL
COMMUNITY
Start: 2025-05-07

## 2025-05-08 RX ORDER — LISINOPRIL 10 MG/1
10 TABLET ORAL DAILY
Qty: 90 TABLET | Refills: 3 | Status: SHIPPED | OUTPATIENT
Start: 2025-05-08

## 2025-05-08 RX ORDER — FLUTICASONE PROPIONATE 0.05 %
CREAM (GRAM) TOPICAL NIGHTLY
COMMUNITY

## 2025-05-08 NOTE — PROGRESS NOTES
Fortunato Valle (: 1965) is a 59 y.o. male, here for evaluation of the following chief complaint(s):  Follow-up (Check up and review labs)     Assessment & Plan  1. Health maintenance.  - Weight has remained stable over the past year, with a slight decrease noted.  - Blood pressure readings are within the normal range.  - Up to date on colonoscopy.  - Advised to receive shingles vaccine, pneumonia vaccine, and COVID-19 vaccine. Recommended a balanced diet, regular exercise, and avoidance of fried foods. Encouraged walking and light weightlifting for physical activity. Advised to avoid eating after 6:00 PM and limit snacks and chips, opting for water or healthy snacks if needed.    2. Hypertension.  - Currently taking lisinopril and has a supply until 2025.  - Blood pressure readings are within the normal range.  - Prescription for lisinopril will be provided for a duration of one year.    3. Eye infection.  - Started taking erythromycin 1 pill a week as prescribed by his eye doctor.  - No reported fever, chills, nausea, vomiting, dizziness, or stomach pains.  - Monitoring for any adverse reactions or lack of improvement.  - Continue erythromycin as prescribed and follow up with the eye doctor if symptoms persist or worsen.  1. Essential hypertension  -     lisinopril (PRINIVIL;ZESTRIL) 10 MG tablet; Take 1 tablet by mouth daily, Disp-90 tablet, R-3Normal  2. IgG deficiency (HCC)    History of Present Illness    The patient presents for a wellness visit.    He reports no current health concerns, including fever, chills, nausea, vomiting, dizziness, or abdominal pain. He has expressed a desire to lose approximately 21 pounds. He does not report any skin issues such as warts, moles, or rashes, but mentions a minor eczema condition on his legs. He makes an effort to limit sun exposure. He is not under the care of an allergist or immunologist and reports no issues with his immune system or sinuses. He

## 2025-07-17 ENCOUNTER — OFFICE VISIT (OUTPATIENT)
Dept: ORTHOPEDIC SURGERY | Age: 60
End: 2025-07-17
Payer: COMMERCIAL

## 2025-07-17 DIAGNOSIS — M25.561 RIGHT KNEE PAIN, UNSPECIFIED CHRONICITY: Primary | ICD-10-CM

## 2025-07-17 DIAGNOSIS — M17.0 PRIMARY OSTEOARTHRITIS OF BOTH KNEES: ICD-10-CM

## 2025-07-17 PROCEDURE — 99203 OFFICE O/P NEW LOW 30 MIN: CPT | Performed by: ORTHOPAEDIC SURGERY

## 2025-07-17 NOTE — PROGRESS NOTES
Name: Fortunato Valle  YOB: 1965  Gender: male  MRN: [id number]      What: Right knee pain and stiffness  How: Starting mid May 2025      HPI: Fortunato Valle is a 59 y.o. male seen for right knee problems.  He has a history of hypertension, and IgG deficiency and he has had a left total hip arthroplasty.  He denies any previous knee problems.  In mid May he noted the onset of medial sided right knee pain.  Its gotten much better.  He notes some soreness and stiffness but overall is doing much better.  He denies any injury.  He denies any locking or catching.      ROS/Meds/PSH/PMH/FH/SH: A ten system review of systems was performed and is negative other than what is in the HPI.   Tobacco:  reports that he has never smoked. He has never used smokeless tobacco.  There were no vitals taken for this visit.     Physical Examination:  He is an awake alert pleasant gentleman ambulating without difficulty    The left knee has a range of motion of 0 to 135 degrees  negative Lachman,  negative anterior drawer,   negative posterior drawer  negative pivot.   Good tibial step-off,   No varus or valgus laxity at 0 or 30 degrees.   Negative lateral joint line tenderness   negative lateral Baljeet.   Negative medial joint line tenderness  negative medial Baljeet.   No evidence of any posterolateral instability.   No patellofemoral pain.   Negative compression,   negative apprehension  no effusion.   Calves Are non-tender,  neurovascularly patient is intact.   Negative Homans.   MPFL is non-tender.   Patient Can fully extend the knee.   Good quad tone  No erythema.   Negative Dial test.    The right knee has a range of motion of 0 to 130 degrees  negative Lachman,  negative anterior drawer,   negative posterior drawer  negative pivot.   Good tibial step-off,   No varus or valgus laxity at 0 or 30 degrees.   Negative lateral joint line tenderness   negative lateral Baljeet.   Mild medial joint line